# Patient Record
Sex: FEMALE | Race: ASIAN | NOT HISPANIC OR LATINO
[De-identification: names, ages, dates, MRNs, and addresses within clinical notes are randomized per-mention and may not be internally consistent; named-entity substitution may affect disease eponyms.]

---

## 2022-09-29 ENCOUNTER — APPOINTMENT (OUTPATIENT)
Dept: ULTRASOUND IMAGING | Facility: CLINIC | Age: 22
End: 2022-09-29

## 2022-09-29 PROCEDURE — 76705 ECHO EXAM OF ABDOMEN: CPT | Mod: 26

## 2022-12-14 ENCOUNTER — OFFICE (OUTPATIENT)
Dept: URBAN - METROPOLITAN AREA CLINIC 109 | Facility: CLINIC | Age: 22
Setting detail: OPHTHALMOLOGY
End: 2022-12-14
Payer: COMMERCIAL

## 2022-12-14 ENCOUNTER — EMERGENCY (EMERGENCY)
Facility: HOSPITAL | Age: 22
LOS: 1 days | Discharge: HOME | End: 2022-12-14
Attending: EMERGENCY MEDICINE | Admitting: EMERGENCY MEDICINE
Payer: COMMERCIAL

## 2022-12-14 VITALS
SYSTOLIC BLOOD PRESSURE: 114 MMHG | RESPIRATION RATE: 18 BRPM | DIASTOLIC BLOOD PRESSURE: 80 MMHG | HEIGHT: 60 IN | HEART RATE: 95 BPM | WEIGHT: 160.06 LBS | OXYGEN SATURATION: 98 % | TEMPERATURE: 99 F

## 2022-12-14 DIAGNOSIS — B00.52: ICD-10-CM

## 2022-12-14 PROCEDURE — 99282 EMERGENCY DEPT VISIT SF MDM: CPT

## 2022-12-14 PROCEDURE — 99281 EMR DPT VST MAYX REQ PHY/QHP: CPT

## 2022-12-14 PROCEDURE — 99203 OFFICE O/P NEW LOW 30 MIN: CPT | Performed by: OPHTHALMOLOGY

## 2022-12-14 RX ORDER — TRIFLURIDINE 1 %
1 DROPS OPHTHALMIC (EYE) ONCE
Refills: 0 | Status: DISCONTINUED | OUTPATIENT
Start: 2022-12-14 | End: 2022-12-14

## 2022-12-14 ASSESSMENT — VISUAL ACUITY
OS_BCVA: 20/30-2
OD_BCVA: 20/30

## 2022-12-14 ASSESSMENT — CONFRONTATIONAL VISUAL FIELD TEST (CVF)
OS_FINDINGS: FULL
OD_FINDINGS: FULL

## 2022-12-14 ASSESSMENT — TONOMETRY
OS_IOP_MMHG: 17
OD_IOP_MMHG: 17

## 2022-12-14 NOTE — ED PROVIDER NOTE - PATIENT PORTAL LINK FT
You can access the FollowMyHealth Patient Portal offered by [facility] by registering at the following website: [email or web address]. By joining [employer]’s FollowMyHealth portal, you will also be able to view your health information using other applications (apps) compatible with our system.

## 2022-12-14 NOTE — ED ADULT TRIAGE NOTE - CHIEF COMPLAINT QUOTE
"I woke up this morning and my left eye was hurting; blurry vision, sensitive to light" left eye 20/50 with glasses, right eye 20/40 with glasses

## 2022-12-14 NOTE — ED ADULT NURSE NOTE - CHPI ED NUR SYMPTOMS NEG
no discharge/no double vision/no drainage/no eye lid swelling/no foreign body/no itching/no photophobia/no purulent drainage

## 2022-12-14 NOTE — ED PROVIDER NOTE - NSFOLLOWUPINSTRUCTIONS_ED_ALL_ED_FT
Herpes Simplex Keratitis      Herpes simplex keratitis is an eye infection that is caused by the herpes simplex virus (HSV). This condition is also called HSV keratitis.    Most of the time, HSV keratitis affects only the surface of the clear covering at the front of the eye (cornea). Sometimes, the virus affects the deeper layers of the cornea and causes ulcers, scarring, and vision loss. This infection can cause eye damage if left untreated.      What are the causes?    This condition is usually caused by HSV-1 but can rarely be caused by HSV-2:  •HSV-1 causes cold sores and fever blisters around the mouth and face.      •HSV-2 causes sores and blisters around the genitals and rectum.      You can infect your eye if you touch an active sore or blister and then touch your eye.    After you have a herpes infection for the first time (primary infection), the virus stays in your system. It can live in the roots of your nerves and cause another infection at a later time (latent infection). The herpes simplex virus can also cause inflammation in the cornea.      What increases the risk?    You are more likely to develop this condition if you:  •Have a cold sore or blister caused by the herpes simplex virus.      •Have already had an HSV keratitis infection, especially if you also wear contact lenses.      •Have a weak disease fighting (immune) system.        What are the signs or symptoms?  A normal eye and a reddened infected eye.   Symptoms of this condition usually occur in only one eye. Symptoms include:  •Pain and redness in the affected eye.      •Sensitivity to light.      •Blurred vision.      •More tears than usual.      •Feeling like there is something in the eye.      •Blisters on the eyelid.      •A discoloration or haziness of the cornea.      •Loss of vision.        How is this diagnosed?    This condition may be diagnosed based on your symptoms and an eye exam. If your health care provider suspects HSV keratitis, you will need to see an eye specialist (ophthalmologist). An ophthalmologist will diagnose HSV keratitis by:  •Examining your eye.      •Placing a type of dye into your eye and then examining your cornea with a microscope (slit lamp). This exam may show a type of ulcer on your cornea caused by the herpes simplex virus infection (dendritic ulcer). If the slit lamp exam does not show the ulcer, a sample from a lesion on your cornea may be sent to a lab and tested for HSV.      •The sensation of your cornea may also be tested as HSV keratitis tends to damage the nerves in the cornea causing decreased sensation.        How is this treated?    There is no cure for HSV keratitis, but treatment can prevent eye damage. Treatment will depend on the severity and type of infection. Repeated (recurrent) infections tend to be more serious and cause more symptoms. Treatment options may include:  •Antiviral eye drops or gels, or oral antiviral medicine.      •A procedure to scrape diseased tissue off the cornea. This may be done for a superficial infection.      •Inflammation may be treated with anti-inflammation (steroid) eye drops.      •Corneal transplant surgery. This surgery may be needed if there is deep corneal infection inflammation that causes scarring and loss of vision.        Follow these instructions at home:      Medicines   A person putting eye drops in an eye.   • Do not use any over-the-counter or prescription eye drops unless your health care provider approves. These drops may contain a steroid medicine that can make your infection worse.      •Use or take over-the-counter and prescription medicines only as told by your health care provider.      •If you were prescribed antiviral eye drops or oral antiviral medicine, use it as told by your health care provider. Do not stop using the antiviral medication even if you start to feel better.        General instructions   Washing hands with soap and water. •Avoid rubbing or touching sores and blisters. If you touch sores or blisters:  •Do not touch your eyes afterward.      •Wash your hands thoroughly with soap and water for at least 20 seconds. If soap and water are not available, use an alcohol-based hand .      •If you wear contacts:  •Remove the lenses if you develop symptoms of HSV keratitis. Wash your hands afterward.      •Ask your health care provider if you should switch to glasses if you have had a recurrent HSV keratitis infection.      •Do not touch a sore or blister and then touch your eye or contact lenses.        •If you wear eyeglasses, clean them as told by your health care provider.      •Keep all follow-up visits. This is important.        Contact a health care provider if:    •You have new symptoms of HSV keratitis.      •Your symptoms are not getting better.      •You have a fever.        Get help right away if:    •You have sudden vision loss.      These symptoms may represent a serious problem that is an emergency. Do not wait to see if the symptoms will go away. Get medical help right away. Call your local emergency services (911 in the [place] Do not drive yourself to the hospital.       Summary    •Herpes simplex keratitis is an eye infection that is caused by the herpes simplex virus.      •This infection can cause eye damage if left untreated.      •There is no cure for HSV keratitis, but treatment can prevent eye damage.      •Treatment may include eye drops or gels and oral medicines, scraping off diseased tissue from the eye, and sometimes, surgery.      This information is not intended to replace advice given to you by your health care provider. Make sure you discuss any questions you have with your health care provider.

## 2022-12-14 NOTE — ED PROVIDER NOTE - CLINICAL SUMMARY MEDICAL DECISION MAKING FREE TEXT BOX
23yo female with diagnosed herpes keratitis unable to get rx, will order meds, tetracaine for comfort

## 2022-12-14 NOTE — ED ADULT NURSE NOTE - OBJECTIVE STATEMENT
Pt p/w left eye redness and swelling, was dx with herpes keratitis and was given rx for an eyedrop, but unable to  fill it because none of the pharmacy has it. tetracaine eye drops given to the left eye per md

## 2022-12-15 ENCOUNTER — OFFICE (OUTPATIENT)
Dept: URBAN - METROPOLITAN AREA CLINIC 109 | Facility: CLINIC | Age: 22
Setting detail: OPHTHALMOLOGY
End: 2022-12-15
Payer: COMMERCIAL

## 2022-12-15 DIAGNOSIS — B00.52: ICD-10-CM

## 2022-12-15 PROCEDURE — 99212 OFFICE O/P EST SF 10 MIN: CPT | Performed by: OPHTHALMOLOGY

## 2022-12-15 ASSESSMENT — VISUAL ACUITY
OD_BCVA: 20/30
OS_BCVA: 20/30-2

## 2022-12-16 ENCOUNTER — OFFICE (OUTPATIENT)
Dept: URBAN - METROPOLITAN AREA CLINIC 109 | Facility: CLINIC | Age: 22
Setting detail: OPHTHALMOLOGY
End: 2022-12-16
Payer: COMMERCIAL

## 2022-12-16 DIAGNOSIS — B00.52: ICD-10-CM

## 2022-12-16 PROCEDURE — 99212 OFFICE O/P EST SF 10 MIN: CPT | Performed by: OPHTHALMOLOGY

## 2022-12-16 ASSESSMENT — VISUAL ACUITY
OD_BCVA: 20/30
OS_BCVA: 20/30-2

## 2022-12-16 ASSESSMENT — CONFRONTATIONAL VISUAL FIELD TEST (CVF)
OS_FINDINGS: FULL
OD_FINDINGS: FULL

## 2023-09-09 NOTE — ED ADULT NURSE NOTE - NSFALLRSKASSESSDT_ED_ALL_ED
You can access the FollowMyHealth Patient Portal offered by [facility] by registering at the following website: [email or web address]. By joining [employer]’s FollowMyHealth portal, you will also be able to view your health information using other applications (apps) compatible with our system.
14-Dec-2022 21:05

## 2024-02-02 ENCOUNTER — APPOINTMENT (OUTPATIENT)
Dept: PULMONOLOGY | Facility: CLINIC | Age: 24
End: 2024-02-02

## 2025-07-28 NOTE — ED PROVIDER NOTE - OBJECTIVE STATEMENT
Medication history reviewed with patient daughter at bedside.     Med rec is complete    Allergies reviewed.     Patient was prescribed a 7 day course of Doxycycline Hyclate 100mg BID on 7/5/25- This was completed per daughter.     Anticoagulants : No    Berkley Hoyos           21yo female with left eye swelling and redness, pain today, was seen at the eye doctor and diagnosed with herpes keratitis and was given a rx but was unable to fill the rx and pt is having pain, no blurry or double vision no other  complaints